# Patient Record
Sex: FEMALE | Race: WHITE | NOT HISPANIC OR LATINO | Employment: FULL TIME | URBAN - METROPOLITAN AREA
[De-identification: names, ages, dates, MRNs, and addresses within clinical notes are randomized per-mention and may not be internally consistent; named-entity substitution may affect disease eponyms.]

---

## 2017-03-06 RX ORDER — ACYCLOVIR 400 MG/1
TABLET ORAL
Qty: 30 TABLET | Refills: 6 | Status: SHIPPED | OUTPATIENT
Start: 2017-03-06 | End: 2017-08-30 | Stop reason: SDUPTHER

## 2017-03-06 NOTE — TELEPHONE ENCOUNTER
Zoe desire refill of Zovirax. Last wwe 11/3/16.  There is no problem list on file for this patient.    Prior to Admission medications    Medication Sig Start Date End Date Taking? Authorizing Provider   acyclovir (ZOVIRAX) 400 MG tablet TAKE 1 TABLET (400 MG TOTAL) BY MOUTH 3 (THREE) TIMES DAILY. START AT FIRST SIGNS OF OUTBREAK 10/24/16   Historical Provider, MD   ascorbic acid, vitamin C, (VITAMIN C) 1000 MG tablet Take 1,000 mg by mouth once daily.    Historical Provider, MD   cetirizine (ZYRTEC) 10 MG tablet Take 10 mg by mouth once daily.    Historical Provider, MD   hydrochlorothiazide (HYDRODIURIL) 12.5 MG Tab Take 1 tablet (12.5 mg total) by mouth daily as needed. 7/24/15 7/23/16  Bessy Ennis, NP   lidocaine HCL 2% (XYLOCAINE) 2 % jelly Apply to affected area every 8 hours as needed. 10/5/16   Lesley Givens MD   norgestimate-ethinyl estradiol (TRI-PREVIFEM, 28,) 0.18/0.215/0.25 mg-35 mcg (28) tablet Take 1 tablet by mouth once daily. 11/7/16   eLsley Givens MD

## 2017-03-06 NOTE — TELEPHONE ENCOUNTER
----- Message from Mansi Hernandez MA sent at 3/6/2017 10:19 AM CST -----  Pt needs refill on Zovirax [facility] pt ph

## 2017-08-22 ENCOUNTER — TELEPHONE (OUTPATIENT)
Dept: INTERNAL MEDICINE | Facility: CLINIC | Age: 27
End: 2017-08-22

## 2017-08-22 NOTE — TELEPHONE ENCOUNTER
----- Message from Nichole Jain MA sent at 2017 12:52 PM CDT -----  Contact: Mother--Eden Bishop  MRN: [id number]  : 1990  PCP: Bessy Ennis  Home Phone      [phone]  Work Phone      Not on file.  Mobile          [phone]      MESSAGE: Patient was diagnosed with Mono last Thursday, the  at [facility].  Patient still with sorethroat, very sluggish, not getting her energy back, she says she wasn't given anything at Urgent Care. Is there anything that can be prescribed.   Send to [facility] (Kat)  Eden's phone # [phone]

## 2017-08-30 RX ORDER — ACYCLOVIR 400 MG/1
TABLET ORAL
Qty: 30 TABLET | Refills: 6 | Status: SHIPPED | OUTPATIENT
Start: 2017-08-30 | End: 2017-09-15

## 2017-08-30 NOTE — TELEPHONE ENCOUNTER
Zoe desire refill of Acyclovir. Annual due 11/17  There is no problem list on file for this patient.    Prior to Admission medications    Medication Sig Start Date End Date Taking? Authorizing Provider   acyclovir (ZOVIRAX) 400 MG tablet TAKE 1 TABLET (400 MG TOTAL) BY MOUTH 3 (THREE) TIMES DAILY. START AT FIRST SIGNS OF OUTBREAK 3/6/17   Lesley Givens MD   ascorbic acid, vitamin C, (VITAMIN C) 1000 MG tablet Take 1,000 mg by mouth once daily.    Historical Provider, MD   cetirizine (ZYRTEC) 10 MG tablet Take 10 mg by mouth once daily.    Historical Provider, MD   hydrochlorothiazide (HYDRODIURIL) 12.5 MG Tab Take 1 tablet (12.5 mg total) by mouth daily as needed. 7/24/15 7/23/16  Bessy Ennis, NP   lidocaine HCL 2% (XYLOCAINE) 2 % jelly Apply to affected area every 8 hours as needed. 10/5/16   Lesley Givens MD   norgestimate-ethinyl estradiol (TRI-PREVIFEM, 28,) 0.18/0.215/0.25 mg-35 mcg (28) tablet Take 1 tablet by mouth once daily. 11/7/16   Lesley Givens MD

## 2017-08-30 NOTE — TELEPHONE ENCOUNTER
----- Message from Therese Dangelo sent at 8/30/2017  9:45 AM CDT -----  Contact: Self  Zoe Bishop  MRN: [id number]  Home Phone      [phone]  Work Phone      Not on file.  Mobile          [phone]    Patient Care Team:  Bessy Ennis NP as PCP - General (Family Medicine)  OB? No  What phone number can you be reached at? [phone]  Message:   Patient needs a refill on Acyclovir called in to [facility] in [place]. Please return call.

## 2017-09-15 ENCOUNTER — CLINICAL SUPPORT (OUTPATIENT)
Dept: OBSTETRICS AND GYNECOLOGY | Facility: CLINIC | Age: 27
End: 2017-09-15
Payer: COMMERCIAL

## 2017-09-15 ENCOUNTER — PATIENT MESSAGE (OUTPATIENT)
Dept: ADMINISTRATIVE | Facility: OTHER | Age: 27
End: 2017-09-15

## 2017-09-15 ENCOUNTER — OFFICE VISIT (OUTPATIENT)
Dept: OBSTETRICS AND GYNECOLOGY | Facility: CLINIC | Age: 27
End: 2017-09-15
Payer: COMMERCIAL

## 2017-09-15 VITALS
HEIGHT: 60 IN | BODY MASS INDEX: 30.23 KG/M2 | DIASTOLIC BLOOD PRESSURE: 66 MMHG | WEIGHT: 154 LBS | HEART RATE: 71 BPM | RESPIRATION RATE: 12 BRPM | SYSTOLIC BLOOD PRESSURE: 118 MMHG

## 2017-09-15 DIAGNOSIS — Z30.013 ENCOUNTER FOR INITIAL PRESCRIPTION OF INJECTABLE CONTRACEPTIVE: Primary | ICD-10-CM

## 2017-09-15 DIAGNOSIS — A60.04 HERPES SIMPLEX VULVOVAGINITIS: ICD-10-CM

## 2017-09-15 DIAGNOSIS — Z30.013 INITIATION OF DEPO PROVERA: Primary | ICD-10-CM

## 2017-09-15 LAB
B-HCG UR QL: NEGATIVE
CTP QC/QA: YES

## 2017-09-15 PROCEDURE — 99999 PR PBB SHADOW E&M-EST. PATIENT-LVL III: CPT | Mod: PBBFAC,,, | Performed by: OBSTETRICS & GYNECOLOGY

## 2017-09-15 PROCEDURE — 3008F BODY MASS INDEX DOCD: CPT | Mod: S$GLB,,, | Performed by: OBSTETRICS & GYNECOLOGY

## 2017-09-15 PROCEDURE — 81025 URINE PREGNANCY TEST: CPT | Mod: QW,S$GLB,, | Performed by: OBSTETRICS & GYNECOLOGY

## 2017-09-15 PROCEDURE — 99213 OFFICE O/P EST LOW 20 MIN: CPT | Mod: S$GLB,,, | Performed by: OBSTETRICS & GYNECOLOGY

## 2017-09-15 PROCEDURE — 96372 THER/PROPH/DIAG INJ SC/IM: CPT | Mod: S$GLB,,, | Performed by: OBSTETRICS & GYNECOLOGY

## 2017-09-15 RX ORDER — NITROFURANTOIN 25; 75 MG/1; MG/1
100 CAPSULE ORAL 2 TIMES DAILY
Refills: 0 | COMMUNITY
Start: 2017-07-06 | End: 2017-09-15

## 2017-09-15 RX ORDER — ACYCLOVIR 400 MG/1
400 TABLET ORAL DAILY
Qty: 90 TABLET | Refills: 2 | Status: SHIPPED | OUTPATIENT
Start: 2017-09-15 | End: 2018-04-17 | Stop reason: SDUPTHER

## 2017-09-15 RX ORDER — MEDROXYPROGESTERONE ACETATE 150 MG/ML
150 INJECTION, SUSPENSION INTRAMUSCULAR
Status: DISCONTINUED | OUTPATIENT
Start: 2017-09-15 | End: 2017-09-15

## 2017-09-15 RX ORDER — MEDROXYPROGESTERONE ACETATE 150 MG/ML
150 INJECTION, SUSPENSION INTRAMUSCULAR
Status: DISCONTINUED | OUTPATIENT
Start: 2017-09-15 | End: 2018-08-29

## 2017-09-15 RX ADMIN — MEDROXYPROGESTERONE ACETATE 150 MG: 150 INJECTION, SUSPENSION INTRAMUSCULAR at 01:09

## 2017-09-15 NOTE — PROGRESS NOTES
Subjective:    Patient ID: Zoe Bishop is a 27 y.o. female    Chief Complaint:   Chief Complaint   Patient presents with    Consult     medications       History of Present Illness:  Zoe presents today desiring change in contraception. Patient's last menstrual period was 08/25/2017.  Her current method of contraception is OCP (estrogen/progesterone). All forms of non-hormonal and hormonal contraception were discussed with the patient. We discussed both combination and progesterone only contraception including all risks, benefits, and alternatives. Patient would like to proceed with DepoProvera. History has been reviewed and no contraindications have been identified.  Discussed with patient instructions on taking the birth control as well as safe sex practices. Patient understands that birth control does not protect against STDs.   She also has a h/o genital herpes.  She reports that she is needing to take her Acyclovir about 2 times per month and that outbreaks have become more frequent on birth control.      ROS:   CONSTITUTIONAL: Negative for fever, chills, diaphoresis, weakness, fatigue, weight loss, weight gain  ENT: Negative for sore throat, nasal congestion, nasal discharge, nosebleeds, ringing in ears, or hearing loss  EYES: Negative for blurred vision, decreased vision, loss of vision, eye pain, double vision, light sensitivity, or eye discharge  SKIN: Negative for rash, itching, or hives  RESPIRATORY: Negative for cough, shortness of breath, pleurisy, wheezing  CARDIOVASCULAR: Negative for chest pain, shortness of breath, palpitations, murmur, or fainting  GASTROINTESTINAL: negative for abdominal pain, flank pain, nausea, vomiting, diarrhea, constipation, black stool, blood in stool  BREAST: negative for breast  tenderness, breast mass, nipple discharge, or skin changes  GENITOURINARY: negative for dysuria, frequency/urgency, hematuria, genital discharge, vaginal bleeding, irregular menses, heavy  menses, pelvic pain  HEMATOLOGIC/LYMPHATIC: negative for swollen lymph nodes, bleeding, bruising  MUSCULOSKELETAL: negative for back pain, joint pain, joint stiffness, joint swelling, muscle pain, muscle weakness  ENDOCRINE: negative for polydipsia/polyuria, palpitations, skin changes, temperature intolerance, unexpected weight changes      Objective:    Vital Signs:  Vitals:    09/15/17 1235   BP: 118/66   Pulse: 71   Resp: 12       Physical Exam:  General:  alert, cooperative, no distress, alert,normal appearing gravid female   Psych/Neuro: AAOx3, appropriate mood and affect   Head: Normocephalic, atraumatic   Neck:  supple, symmetric with no tracheal deviation   Respiratory: Normal respiratory effort   Skin:  Skin color, texture, turgor normal. No rashes or lesions   Ext: No clubbing, cyanosis, edema         Assessment:      Encounter Diagnoses   Name Primary?    Encounter for initial prescription of injectable contraceptive Yes    Herpes simplex vulvovaginitis        Plan:    1. Encounter for initial prescription of injectable contraceptive  - POCT Urine Pregnancy  - medroxyPROGESTERone (DEPO-PROVERA) injection 150 mg; Inject 1 mL (150 mg total) into the muscle every 3 (three) months.    2. Herpes simplex vulvovaginitis  Will start suppression therapy.  - acyclovir (ZOVIRAX) 400 MG tablet; Take 1 tablet (400 mg total) by mouth once daily.  Dispense: 90 tablet; Refill: 2

## 2017-09-15 NOTE — PROGRESS NOTES
UPT negative. Depo Provera injection given RUOQ per order. Patient instructed to wait 20 minutes after injection administration. Patient verbalized understanding with no questions or concerns.

## 2017-11-17 ENCOUNTER — TELEPHONE (OUTPATIENT)
Dept: OBSTETRICS AND GYNECOLOGY | Facility: CLINIC | Age: 27
End: 2017-11-17

## 2017-11-17 NOTE — TELEPHONE ENCOUNTER
----- Message from Therese Dangelo sent at 11/17/2017 11:00 AM CST -----  Contact: Self  Zoe Bishop  MRN: [id number]  Home Phone      [phone]  Work Phone      Not on file.  Mobile          [phone]    Patient Care Team:  Bessy Ennis NP as PCP - General (Family Medicine)  OB? No  What phone number can you be reached at? [phone]  Message:   Patient states she is having trouble getting her medication for Herpes filled because of a quantity issue with the pharmacy, please return call.

## 2017-11-17 NOTE — TELEPHONE ENCOUNTER
Spoke to [facility] pharmacy, she states that the last prescription was picked up on 10/23/17. It was too early to  when patient called. She states that the earliest patient can  is tomorrow. Left message for patient to contact office.

## 2017-12-06 ENCOUNTER — CLINICAL SUPPORT (OUTPATIENT)
Dept: OBSTETRICS AND GYNECOLOGY | Facility: CLINIC | Age: 27
End: 2017-12-06
Payer: COMMERCIAL

## 2017-12-06 DIAGNOSIS — Z30.42 ENCOUNTER FOR DEPO-PROVERA CONTRACEPTION: Primary | ICD-10-CM

## 2017-12-06 PROCEDURE — 96372 THER/PROPH/DIAG INJ SC/IM: CPT | Mod: S$GLB,,, | Performed by: OBSTETRICS & GYNECOLOGY

## 2017-12-06 RX ADMIN — MEDROXYPROGESTERONE ACETATE 150 MG: 150 INJECTION, SUSPENSION INTRAMUSCULAR at 11:12

## 2017-12-06 NOTE — PROGRESS NOTES
[facility]. Pt instructed to wait 20 minutes to ensure no reaction noted. Verbalized understanding.

## 2018-04-16 DIAGNOSIS — A60.04 HERPES SIMPLEX VULVOVAGINITIS: ICD-10-CM

## 2018-04-16 RX ORDER — ACYCLOVIR 400 MG/1
400 TABLET ORAL DAILY
Qty: 90 TABLET | Refills: 1 | OUTPATIENT
Start: 2018-04-16 | End: 2018-10-13

## 2018-04-17 RX ORDER — ACYCLOVIR 400 MG/1
400 TABLET ORAL DAILY
Qty: 90 TABLET | Refills: 1 | Status: SHIPPED | OUTPATIENT
Start: 2018-04-17 | End: 2018-08-29 | Stop reason: SDUPTHER

## 2018-04-17 NOTE — TELEPHONE ENCOUNTER
Zoe desire refill of Zovirax. Last annual 9/17 with Dr. Givens.   There is no problem list on file for this patient.    Prior to Admission medications    Medication Sig Start Date End Date Taking? Authorizing Provider   acyclovir (ZOVIRAX) 400 MG tablet Take 1 tablet (400 mg total) by mouth once daily. 9/15/17 3/14/18  Lesley Givens MD   ascorbic acid, vitamin C, (VITAMIN C) 1000 MG tablet Take 1,000 mg by mouth once daily.    Historical Provider, MD   cetirizine (ZYRTEC) 10 MG tablet Take 10 mg by mouth once daily.    Historical Provider, MD   hydrochlorothiazide (HYDRODIURIL) 12.5 MG Tab Take 1 tablet (12.5 mg total) by mouth daily as needed. 7/24/15 7/23/16  Bessy Ennis, NP   lidocaine HCL 2% (XYLOCAINE) 2 % jelly Apply to affected area every 8 hours as needed. 10/5/16   Lesley Givens MD

## 2018-04-17 NOTE — TELEPHONE ENCOUNTER
----- Message from Therese Dangelo sent at 4/17/2018  4:30 PM CDT -----  Contact: Self  Zoe Bishop  MRN: [id number]  Home Phone      [phone]  Work Phone      Not on file.  Mobile          [phone]    Patient Care Team:  Bessy Ennis NP as PCP - General (Family Medicine)  OB? No  What phone number can you be reached at? [phone]  Message:   Patient states she needs an authorization to get a refill on her herpes medication. Please return call.

## 2018-08-29 ENCOUNTER — LAB VISIT (OUTPATIENT)
Dept: LAB | Facility: HOSPITAL | Age: 28
End: 2018-08-29
Attending: OBSTETRICS & GYNECOLOGY
Payer: COMMERCIAL

## 2018-08-29 ENCOUNTER — OFFICE VISIT (OUTPATIENT)
Dept: OBSTETRICS AND GYNECOLOGY | Facility: CLINIC | Age: 28
End: 2018-08-29
Payer: COMMERCIAL

## 2018-08-29 VITALS
DIASTOLIC BLOOD PRESSURE: 86 MMHG | BODY MASS INDEX: 35.34 KG/M2 | SYSTOLIC BLOOD PRESSURE: 128 MMHG | HEIGHT: 60 IN | HEART RATE: 77 BPM | WEIGHT: 180 LBS | RESPIRATION RATE: 14 BRPM

## 2018-08-29 DIAGNOSIS — Z01.419 WELL WOMAN EXAM WITH ROUTINE GYNECOLOGICAL EXAM: Primary | ICD-10-CM

## 2018-08-29 DIAGNOSIS — A60.04 HERPES SIMPLEX VULVOVAGINITIS: ICD-10-CM

## 2018-08-29 DIAGNOSIS — R53.83 FATIGUE, UNSPECIFIED TYPE: ICD-10-CM

## 2018-08-29 DIAGNOSIS — Z12.4 CERVICAL CANCER SCREENING: ICD-10-CM

## 2018-08-29 LAB — TSH SERPL DL<=0.005 MIU/L-ACNC: 1.95 UIU/ML

## 2018-08-29 PROCEDURE — 99999 PR PBB SHADOW E&M-EST. PATIENT-LVL III: CPT | Mod: PBBFAC,,, | Performed by: OBSTETRICS & GYNECOLOGY

## 2018-08-29 PROCEDURE — 84443 ASSAY THYROID STIM HORMONE: CPT

## 2018-08-29 PROCEDURE — 36415 COLL VENOUS BLD VENIPUNCTURE: CPT

## 2018-08-29 PROCEDURE — 99395 PREV VISIT EST AGE 18-39: CPT | Mod: S$GLB,,, | Performed by: OBSTETRICS & GYNECOLOGY

## 2018-08-29 PROCEDURE — 88175 CYTOPATH C/V AUTO FLUID REDO: CPT

## 2018-08-29 RX ORDER — ACYCLOVIR 400 MG/1
400 TABLET ORAL DAILY
Qty: 90 TABLET | Refills: 3 | Status: SHIPPED | OUTPATIENT
Start: 2018-08-29 | End: 2019-09-09 | Stop reason: SDUPTHER

## 2018-08-29 NOTE — PROGRESS NOTES
Subjective:    Patient ID: Zoe Butler is a 28 y.o. y.o. female.     Chief Complaint: Annual Well Woman Exam     History of Present Illness:  Zoe presents today for Annual Well Woman exam. She describes her menses as regular every month without intermenstrual spotting.She denies pelvic pain.  She denies breast tenderness, masses, nipple discharge. She denies difficulty with urination or bowel movements. She denies bloating, early satiety, or weight changes. She is sexually active. Contraception is by no method.  Stopped DepoProvera about 1 year ago.  Reports daily fatigue for the past year.  She had mono at the end of September, but remains fatigued.      Menstrual History:   Patient's last menstrual period was 2018..     OB History      Para Term  AB Living    1 1 1     1    SAB TAB Ectopic Multiple Live Births            1          The following portions of the patient's history were reviewed and updated as appropriate: allergies, current medications, past family history, past medical history, past social history, past surgical history and problem list.    ROS:   CONSTITUTIONAL: weight gain, fatigue Denies: fever, chills, diaphoresis  ENT: negative for sore throat, nasal congestion, nasal discharge, epistaxis, tinnitus, hearing loss  EYES: negative for blurry vision, decreased vision, loss of vision, eye pain, diplopia, photophobia, discharge  SKIN: Negative for rash, itching, hives  RESPIRATORY: negative for cough, hemoptysis, shortness of breath, pleuritic chest pain, wheezing  CARDIOVASCULAR: edema, Denies: chest pain, palpitations  BREAST: negative for breast  tenderness, breast mass, nipple discharge, or skin changes  GASTROINTESTINAL: abdominal pain, Denies: flank pain, nausea, vomiting, diarrhea, constipation  GENITOURINARY: heavy menses, negative for abnormal vaginal bleeding, amenorrhea, decreased libido, dysuria, genital sores, hematuria, incontinence, pelvic pain, urinary  frequency, vaginal discharge  HEMATOLOGIC/LYMPHATIC: negative for swollen lymph nodes, bleeding, bruising  MUSCULOSKELETAL: negative for back pain, joint pain, joint stiffness, joint swelling, muscle pain, muscle weakness  NEUROLOGICAL: negative for dizzy/vertigo, headache, focal weakness, numbness/tingling, speech problems, loss of consciousness, confusion, memory loss  BEHAVORIAL/PSYCH: No psychosis and Positive for anxiety and depression  ENDOCRINE: negative for polydipsia/polyuria, palpitations, skin changes, temperature intolerance, unexpected weight changes    Objective:    Vital Signs:  Vitals:    08/29/18 1209   BP: 128/86   Pulse: 77   Resp: 14       Physical Exam:  General:  alert, cooperative, no distress   Skin:  Skin color, texture, turgor normal. No rashes or lesions   HEENT:  extra ocular movement intact, sclera clear, anicteric   Neck: supple, trachea midline, no adenopathy or thyromegally   Respiratory:  Normal effort   Breasts:  no discharge, erythema, tenderness, or palpable masses; no axillary lymphadenopathy   Abdomen:  soft, nontender, no palpable masses   Pelvis: External genitalia: normal general appearance  Urinary system: urethral meatus normal, bladder nontender  Vaginal: normal mucosa without prolapse or lesions  Cervix: normal appearance  Uterus: normal size, shape, position  Adnexa: normal size, nontender bilaterally   Extremities: Normal ROM; no edema, no cyanosis   Neurologial: Normal strength and tone. No focal numbness or weakness.   Psychiatric: normal mood, speech, dress, and thought processes         Assessment:       Healthy female exam.     1. Well woman exam with routine gynecological exam    2. Cervical cancer screening    3. Fatigue, unspecified type    4. Herpes simplex vulvovaginitis          Plan:      Well woman exam with routine gynecological exam    Cervical cancer screening  -     Liquid-based pap smear, screening    Fatigue, unspecified type  -     TSH; Future;  Expected date: 08/29/2018    Herpes simplex vulvovaginitis  -     acyclovir (ZOVIRAX) 400 MG tablet; Take 1 tablet (400 mg total) by mouth once daily.  Dispense: 90 tablet; Refill: 3          COUNSELING:  Zoe was counseled on A.C.O.G. Pap guidelines and recommendations for yearly pelvic exams in addition to recommendations for monthly self breast exams; to see her PCP for other health maintenance.

## 2019-09-08 DIAGNOSIS — A60.04 HERPES SIMPLEX VULVOVAGINITIS: ICD-10-CM

## 2019-09-09 RX ORDER — ACYCLOVIR 400 MG/1
400 TABLET ORAL DAILY
Qty: 30 TABLET | Refills: 11 | Status: SHIPPED | OUTPATIENT
Start: 2019-09-09 | End: 2020-07-28

## 2020-01-27 PROBLEM — O34.211 ENCOUNTER FOR MATERNAL CARE FOR LOW TRANSVERSE SCAR FROM PREVIOUS CESAREAN DELIVERY: Status: ACTIVE | Noted: 2020-01-27

## 2021-05-04 ENCOUNTER — PATIENT MESSAGE (OUTPATIENT)
Dept: RESEARCH | Facility: HOSPITAL | Age: 31
End: 2021-05-04

## 2022-11-02 ENCOUNTER — PATIENT OUTREACH (OUTPATIENT)
Dept: ADMINISTRATIVE | Facility: OTHER | Age: 32
End: 2022-11-02
Payer: COMMERCIAL

## 2022-11-02 VITALS — SYSTOLIC BLOOD PRESSURE: 126 MMHG | OXYGEN SATURATION: 99 % | HEART RATE: 84 BPM | DIASTOLIC BLOOD PRESSURE: 80 MMHG

## 2023-10-03 ENCOUNTER — PATIENT OUTREACH (OUTPATIENT)
Dept: ADMINISTRATIVE | Facility: OTHER | Age: 33
End: 2023-10-03
Payer: COMMERCIAL

## 2023-10-03 VITALS — HEART RATE: 82 BPM | DIASTOLIC BLOOD PRESSURE: 78 MMHG | OXYGEN SATURATION: 98 % | SYSTOLIC BLOOD PRESSURE: 120 MMHG
